# Patient Record
Sex: MALE | Race: ASIAN | URBAN - METROPOLITAN AREA
[De-identification: names, ages, dates, MRNs, and addresses within clinical notes are randomized per-mention and may not be internally consistent; named-entity substitution may affect disease eponyms.]

---

## 2023-02-09 ENCOUNTER — TELEPHONE (OUTPATIENT)
Dept: INTERNAL MEDICINE CLINIC | Facility: CLINIC | Age: 43
End: 2023-02-09

## 2023-02-09 NOTE — TELEPHONE ENCOUNTER
This patient has not been seen at [facility] or by Dr. Rosa Curry since 2013.   We are removing Dr. Rosa Curry as PCP

## 2023-02-24 ENCOUNTER — PATIENT OUTREACH (OUTPATIENT)
Dept: CASE MANAGEMENT | Age: 43
End: 2023-02-24

## 2023-02-24 NOTE — PROCEDURES
The office order for PCP request is Approved and finalized on February 24, 2023.     Thanks,  [facility] [employer]